# Patient Record
Sex: MALE | Race: WHITE | NOT HISPANIC OR LATINO | Employment: FULL TIME | URBAN - METROPOLITAN AREA
[De-identification: names, ages, dates, MRNs, and addresses within clinical notes are randomized per-mention and may not be internally consistent; named-entity substitution may affect disease eponyms.]

---

## 2017-07-09 ENCOUNTER — HOSPITAL ENCOUNTER (EMERGENCY)
Facility: HOSPITAL | Age: 37
Discharge: HOME | End: 2017-07-09
Attending: EMERGENCY MEDICINE | Admitting: EMERGENCY MEDICINE

## 2017-07-09 VITALS
OXYGEN SATURATION: 98 % | HEART RATE: 82 BPM | WEIGHT: 200 LBS | DIASTOLIC BLOOD PRESSURE: 72 MMHG | TEMPERATURE: 97.9 F | RESPIRATION RATE: 16 BRPM | SYSTOLIC BLOOD PRESSURE: 139 MMHG | BODY MASS INDEX: 25.67 KG/M2 | HEIGHT: 74 IN

## 2017-07-09 DIAGNOSIS — H10.32 ACUTE BACTERIAL CONJUNCTIVITIS OF LEFT EYE: Primary | ICD-10-CM

## 2017-07-09 PROCEDURE — 99283 EMERGENCY DEPT VISIT LOW MDM: CPT

## 2017-07-09 RX ORDER — POLYMYXIN B SULFATE AND TRIMETHOPRIM 1; 10000 MG/ML; [USP'U]/ML
1 SOLUTION OPHTHALMIC EVERY 4 HOURS
Qty: 10 ML | Refills: 0 | Status: SHIPPED | OUTPATIENT
Start: 2017-07-09 | End: 2017-07-19

## 2017-07-10 NOTE — DISCHARGE INSTRUCTIONS
Follow up with one of the physician centers below to setup primary care.    [facility], [phone], [address], [address], [place] [address]    [facility], [phone], [address] [facility], [address] [place] [address], [phone], [address] [place] [address] [address] [place] [address];     [facility], [phone], [address] [place] [address]

## 2017-07-10 NOTE — ED PROVIDER NOTES
Subjective   Patient is a 37 y.o. male presenting with conjunctivitis.   History provided by:  Patient  Conjunctivitis    Episode onset: Today. There is a problem in the left eye. There was no injury mechanism. There is no history of trauma to the eye. There is no known exposure to pink eye. He does not wear contacts. Associated symptoms include discharge, eye redness and itching. Pertinent negatives include no blurred vision, no foreign body sensation and no photophobia. He has tried nothing for the symptoms.       Review of Systems   Eyes: Positive for discharge, redness and itching. Negative for blurred vision, photophobia and pain.   Skin: Positive for itching.   All other systems reviewed and are negative.      History reviewed. No pertinent past medical history.    No Known Allergies    History reviewed. No pertinent surgical history.    History reviewed. No pertinent family history.    Social History     Social History   • Marital status:      Spouse name: N/A   • Number of children: N/A   • Years of education: N/A     Social History Main Topics   • Smoking status: Current Every Day Smoker     Packs/day: 1.00   • Smokeless tobacco: None   • Alcohol use No   • Drug use: No   • Sexual activity: Not Asked     Other Topics Concern   • None     Social History Narrative   • None           Objective   Physical Exam   Constitutional: He appears well-developed and well-nourished. No distress.   HENT:   Head: Atraumatic.   Eyes: EOM and lids are normal. Pupils are equal, round, and reactive to light. Right conjunctiva is not injected. Right conjunctiva has no hemorrhage. Left conjunctiva is injected. Left conjunctiva has no hemorrhage.   Purulent drainage  / matting to left eye    Neck: Normal range of motion. Neck supple.   Cardiovascular: Normal rate.    Pulmonary/Chest: Effort normal. No respiratory distress.   Neurological: He is alert.   Skin: Skin is warm.   Psychiatric: He has a normal mood and affect.  "  Nursing note and vitals reviewed.      Procedures         ED Course  ED Course   Discussed treatment plan with patient and he is agreeable. Will provide info for Opthamology f/u.      No results found for this or any previous visit (from the past 24 hour(s)).  Note: In addition to lab results from this visit, the labs listed above may include labs taken at another facility or during a different encounter within the last 24 hours. Please correlate lab times with ED admission and discharge times for further clarification of the services performed during this visit.    No orders to display     Vitals:    07/09/17 2032   BP: 139/72   BP Location: Left arm   Patient Position: Sitting   Pulse: 82   Resp: 16   Temp: 97.9 °F (36.6 °C)   TempSrc: Oral   SpO2: 98%   Weight: 200 lb (90.7 kg)   Height: 74\" (188 cm)     Medications - No data to display                      MDM    Final diagnoses:   Acute bacterial conjunctivitis of left eye            SAADIA Talley  07/09/17 9144    "